# Patient Record
Sex: MALE | Race: WHITE | Employment: OTHER | URBAN - METROPOLITAN AREA
[De-identification: names, ages, dates, MRNs, and addresses within clinical notes are randomized per-mention and may not be internally consistent; named-entity substitution may affect disease eponyms.]

---

## 2021-05-18 PROBLEM — M25.561 CHRONIC PAIN OF BOTH KNEES: Status: ACTIVE | Noted: 2021-05-18

## 2021-05-18 PROBLEM — M25.562 CHRONIC PAIN OF BOTH KNEES: Status: ACTIVE | Noted: 2021-05-18

## 2021-05-18 PROBLEM — G89.29 CHRONIC PAIN OF BOTH KNEES: Status: ACTIVE | Noted: 2021-05-18

## 2022-03-19 PROBLEM — G89.29 CHRONIC PAIN OF BOTH KNEES: Status: ACTIVE | Noted: 2021-05-18

## 2022-03-19 PROBLEM — M25.561 CHRONIC PAIN OF BOTH KNEES: Status: ACTIVE | Noted: 2021-05-18

## 2022-03-19 PROBLEM — M25.562 CHRONIC PAIN OF BOTH KNEES: Status: ACTIVE | Noted: 2021-05-18

## 2022-03-22 ENCOUNTER — HOSPITAL ENCOUNTER (OUTPATIENT)
Dept: PHYSICAL THERAPY | Age: 87
Discharge: HOME | End: 2022-03-22
Payer: MEDICARE

## 2022-03-22 PROCEDURE — 97110 THERAPEUTIC EXERCISES: CPT

## 2022-03-22 PROCEDURE — 97161 PT EVAL LOW COMPLEX 20 MIN: CPT

## 2022-03-22 NOTE — THERAPY EVALUATION
Rica Iqbal  : 1930  Primary: James Mcgill Medicare Advantage  Secondary:  [address] at [facility]  [address], [address]  [phone]   [phone]          OUTPATIENT PHYSICAL THERAPY:Initial Assessment 3/22/2022   ICD-10: Treatment Diagnosis: M62.81, R26.89  Precautions/Allergies:   Patient has no known allergies. TREATMENT PLAN:  Effective Dates: 3/22/2022 TO 2022 (90 days). Frequency/Duration: 2 times a week for 90 Day(s) MEDICAL/REFERRING DIAGNOSIS:  Unspecified fall, initial encounter [W19. XXXA]  Other abnormalities of gait and mobility [R26.89]   DATE OF ONSET: Several months  REFERRING PHYSICIAN: Juan Weston MD MD Orders: Suzi Keating and treat  Return MD Appointment:      INITIAL ASSESSMENT:  Mr. Nik Patel presents to physical therapy with MD diagnosis of a frequent falls. Pt demonstrated signs and symptoms consistent with this diagnosis. On objective examination, the patient demonstrated deficits in ROM, strength, joint mobility, soft tissue mobility, functional ability, functional mobility, ambulatory ability and balance. The patient also had increased fall risk. The patient is limited in the following activities: walking, standing, transfers, ADLs, functional tasks, mobility, stairs. The patient has a good  prognosis for recovery based on the examination findings and may be limited by: N/A. Patient requires skilled physical therapy services in order to return to prior level of function. PROBLEM LIST (Impacting functional limitations):  1. Decreased Strength  2. Decreased ADL/Functional Activities  3. Decreased Transfer Abilities  4. Decreased Ambulation Ability/Technique  5. Decreased Balance  6. Increased Pain  7. Decreased Activity Tolerance  8. Decreased Knowledge of Precautions  9.  Decreased [place] with Home Exercise Program INTERVENTIONS PLANNED: (Treatment may consist of any combination of the following)  1. Balance Exercise  2. Cold  3. Heat  4. Home Exercise Program (HEP)  5. Manual Therapy  6. Neuromuscular Re-education/Strengthening  7. Range of Motion (ROM)  8. Therapeutic Activites  9. Therapeutic Exercise/Strengthening     GOALS: (Goals have been discussed and agreed upon with patient.)  Short-Term Functional Goals: Time Frame: 4 weeks  1. Pt will be compliant with progressive HEP  2. Pt will be able to perform 10min on aerobic exercise device without significant fatigue (> 5/10)  Discharge Goals: Time Frame: 10 weeks  1. Pt will be able to walk > 20min without significant fatigue (> 5/10)  2. Pt will improve ALONZO score by 6pts  3. Pt will improve DGI score by 4pts    OUTCOME MEASURE:   Tool Used: Alonzo Balance Scale  Score:  Initial: 36/56 Most Recent: X/56 (Date: -- )   Interpretation of Score: Each section is scored on a 0-4 scale, 0 representing the patients inability to perform the task and 4 representing independence. The scores of each section are added together for a total score of 56. The higher the patients score, the more independent the patient is. Any score below 45 indicates increased risk for falls. MEDICAL NECESSITY:   · Patient is expected to demonstrate progress in strength, range of motion and symptom levels to return to full function. REASON FOR SERVICES/OTHER COMMENTS:  · Patient requires skilled physical therapy in order to return to prior level of function      Rehabilitation Potential For Stated Goals: Good  Regarding Tea Moise's therapy, I certify that the treatment plan above will be carried out by a therapist or under their direction.   Thank you for this referral,  Romeo Fajardo PT, DPT, OCS  Referring Physician Signature: Valerie Woods MD _______________________________ Date _____________     PAIN/SUBJECTIVE:   Initial:   0/10 Post Session:  0/10   HISTORY:   History of Injury/Illness (Reason for Referral):  Pt had a fall in the last week, this is fall 2-3 in the last few months. All the falls have had similar causes, feels weak in a leg and stumbles. Has not sustained any significant injuries with these falls. States he is having difficulty rising from a bent over or squatted position. Feels weaker overall. Pt has a cane, carries it around more than he uses it, Denies numbness/tingling in the legs. Walking > 10-15min fatigues him. Past Medical History/Comorbidities:   Mr. Dia Potter  has no past medical history on file. Mr. Dia Potter  has a past surgical history that includes hx radical prostatectomy; hx cataract removal; and hx colonoscopy. Social History/Living Environment:     2-3 falls in the last 3 months  10-12 stairs in house  Lives with wife  Prior Level of Function/Work/Activity:  Golf, yard work, walk  Personal Factors:          Sex:  male        Age:  80 y.o. Ambulatory/Rehab Services H2 Model Falls Risk Assessment   Risk Factors:       (1)  Gender [Male]       (5)  History of Recent Falls [w/in 3 months] Ability to Rise from Chair:       (1)  Pushes up, successful in one attempt   Falls Prevention Plan: Mobility Assistance Device (specify):  has a quad cane   Total: (5 or greater = High Risk): 7   ©2010 [facility] [address] [place] [address] States Patent #[id number]. Federal Law prohibits the replication, distribution or use without written permission from [facility]   Current Medications:       Current Outpatient Medications:     OTHER,NON-FORMULARY,, Take 1 Tab by mouth daily. , Disp: , Rfl:     aspirin delayed-release 81 mg tablet, Take 81 mg by mouth daily. , Disp: , Rfl:     carvediloL (COREG) 3.125 mg tablet, , Disp: , Rfl:     Iron Fum & PS Cmp-Vit C-Niacin (Integra) 125-40-3 mg cap, Take 1 Cap by mouth daily. , Disp: , Rfl:     levothyroxine (SYNTHROID) 75 mcg tablet, , Disp: , Rfl:     Iron Fum & P-FA-Vit B & C No.9 (INTEGRA PLUS) 125 mg iron- 1 mg cap, Take  by mouth., Disp: , Rfl:    multivitamin (DAILY MULTIPLE) tablet, Take 1 Tab by mouth daily. , Disp: , Rfl:    Date Last Reviewed:  03/22/22     Number of Personal Factors/Comorbidities that affect the Plan of Care: 1-2: MODERATE COMPLEXITY   EXAMINATION:   *= Painful     WNL= within normal limits     NT= not tested    Observation  Gait: quick, erratic at times, B varus, leans to the R and L causing him to have a non-straight path      Strength (all MMT scores are graded on a scale of 0-5)   Right Left   Hip      Flexion 5 5   Abduction 4 4   Extension 4 4   Glute Max 4 4   Knee     Extension 5 5   Flexion 5 5         Functional Mobility  TUG: 10.8sec  30sec Sit to Stand: 20x (w/ hands, lacks control), 4x (w/o hands, fatigues quickly)      Balance  Rhomberg:   Eyes open, stable surface: 120sec   Eyes closed, stable surface: 10sec (very unsteady initially)  Sharpened Rhomberg: (modified)   R in front: 30sec   L in front: 30sec  SL balance:   R: 0sec   L: 0sec    Tool Used: TINETTI  Score:  Initial:   Gait: 9/12  Balance: 10/16  TOTAL: 19/28 Most Recent:  Gait: /12  Balance: /16  TOTAL: /28   Interpretation of Score: The maximum score for the gait component is 12 points. The maximum score for the balance component is 16 points. The maximum total score is 28 points. In general, patients who score below 19 are at a high risk for falls. Patients who score in the range of 19-24 indicate that the patient has a risk for falls. Tool Used: Dynamic Gait Index  Score:  Initial: 14/24 Most Recent: X/24 (Date: -- )   Interpretation of Score: Each section is scored on a 0-3 scale, 0 representing the patients inability to perform the task and 3 representing independence. The scores of each section are added together for a total score of 24. Any score below 19 indicates increased risk for falls. Body Structures Involved:  1. Bones  2. Joints  3. Muscles  4. Ligaments Body Functions Affected:  1. Sensory/Pain  2. Neuromusculoskeletal  3.  Movement Related Activities and Participation Affected:  1. General Tasks and Demands  2. Mobility  3. Self Care  4. Domestic Life  5. Interpersonal Interactions and Relationships  6.  Community, Social and [place]   Number of elements (examined above) that affect the Plan of Care: 3: MODERATE COMPLEXITY   CLINICAL PRESENTATION:   Presentation: Stable and uncomplicated: LOW COMPLEXITY   CLINICAL DECISION MAKING:   Use of outcome tool(s) and clinical judgement create a POC that gives a: Clear prediction of patient's progress: LOW COMPLEXITY     Jordan Kingston PT, DPT, OCS

## 2022-03-22 NOTE — PROGRESS NOTES
Rehana Ochoa  : 1930  Primary: Florencio Mcgill Medicare Advantage  Secondary:  [address] at [facility]  [address], [address]  [phone]   [phone]         OUTPATIENT PHYSICAL THERAPY:Daily Note 3/22/2022      TREATMENT:   PT Patient Time In/Time Out  Time In: 1300  Time Out: 1345      Total Time: 45min  Visit Count:  1     ICD-10: Treatment Diagnosis: M62.81, R26.89  Medication Last Reviewed: 22      TREATMENT PLAN  Effective Dates: 3/22/2022 TO 2022 (90 days). Frequency/Duration: 2 times a week for 90 Day(s)         Subjective: See Evaluation Note dated 3/22/2022 for details   Pain:     Objective: See Evaluation Note dated 3/22/2022 for details      Therapeutic Exercise: (15min) Done in order to improve strength, ROM and understanding of current condition.     Date:  3/22 Date:   Date:   Date:     Activity/Exercise Parameters      Education Discussed examination findings, HEP, plan of care      Standing Balance 3x feet together  · Eyes open x30sec  · 10x vertical head nods  · 10x R/L head turns                                                                         Manual Therapy: (0min) Done in order to improve joint and soft tissue mobility,reduce muscle guarding, and decrease muscle tone   Date:   Date:   Date:   Date:     Type Parameters      Joint Mobilization       Soft Tissue Mobilization           Modalities: (-) Done in order to reduce swelling and pain    Assessment: See Evaluation Note dated 3/22/2022 for details    Plan: See Evaluation Note dated 3/22/2022 for details    Future Appointments   Date Time Provider Deborah Kwon   3/29/2022  2:30 PM Frieda Bellis, PT [facility]   3/31/2022 10:15 AM Frieda Bellis, PT [facility]   2022  3:15 PM Frieda Bellis, PT [facility]   2022 10:15 AM Frieda Bellis, PT [facility]       Unbilled Time: 30min eval  Units: 1 eval low/ CHITO Stephen, PT, DPT, OCS    Visit Approval Visit # Therapist initials Date A NS / Cx < 24 hr >24 hr Cx Comments    1 WJ 3/22 [x]  [] [] Initial evaluation       [] [] []        [] [] []        [] [] []        [] [] []        [] [] []        [] [] []        [] [] []        [] [] []        [] [] []        [] [] []        [] [] []        [] [] []        [] [] []        [] [] []        [] [] []        [] [] []        [] [] []

## 2022-03-29 ENCOUNTER — HOSPITAL ENCOUNTER (OUTPATIENT)
Dept: PHYSICAL THERAPY | Age: 87
Discharge: HOME | End: 2022-03-29
Payer: MEDICARE

## 2022-03-29 PROCEDURE — 97110 THERAPEUTIC EXERCISES: CPT

## 2022-03-29 NOTE — PROGRESS NOTES
Gera Cons  : 1930  Primary: Ervin Mcgill Medicare Advantage  Secondary:  [address] at [facility]  [address], [facility], [address]  [phone]   [phone]         OUTPATIENT PHYSICAL THERAPY:Daily Note 3/29/2022      TREATMENT:   PT Patient Time In/Time Out  Time In: 1430  Time Out: 1515      Total Time: 45min  Visit Count:  2     ICD-10: Treatment Diagnosis: M62.81, R26.89  Medication Last Reviewed: 22      TREATMENT PLAN  Effective Dates: 3/22/2022 TO 2022 (90 days). Frequency/Duration: 2 times a week for 90 Day(s)         Subjective: Pt states he is tired, exercises have been going well   Pain:     Objective: Therapeutic Exercise: (45min) Done in order to improve strength, ROM and understanding of current condition. Date:  3/22 Date:  3/29 Date:   Date:     Activity/Exercise Parameters      Education Discussed examination findings, HEP, plan of care      NuStep  x10min lvl 4     Standing Balance 3x feet together  · Eyes open x30sec  · 10x vertical head nods  · 10x R/L head turns · Feet together, EO, R/L and vertical head turns  · Semi-tandem EO  · Ball passes     Hip ABD  2x10 B 25lbs     Hip Flexion  2x10 B 25lbs     Sit to Stand  3x10 (2x10 w/ airex in chair)     Walking  x500ft cone weaves and outside                                            Manual Therapy: (0min) Done in order to improve joint and soft tissue mobility,reduce muscle guarding, and decrease muscle tone   Date:   Date:   Date:   Date:     Type Parameters      Joint Mobilization       Soft Tissue Mobilization           Modalities: (-) Done in order to reduce swelling and pain    Assessment: Pt tolerated session well, L LE will randomly give out.  Needs to be cued to slow down    Plan: continue per POC    Future Appointments   Date Time Provider Deborah Kwon   3/31/2022 10:15 AM Darrian León PT [facility]   2022  3:15 PM Rodriguez Reza Manolo Vo, PT [facility]   4/7/2022 10:15 AM Diana Renteria, PT [facility]       Sudheer Romeo Time:  Units: Cleda Duel, PT, DPT, OCS    Visit Approval Visit # Therapist initials Date A NS / Cx < 24 hr >24 hr Cx Comments    1 WJ 3/22 [x]  [] [] Initial evaluation    2 WJ 3/29 [x] [] []        [] [] []        [] [] []        [] [] []        [] [] []        [] [] []        [] [] []        [] [] []        [] [] []        [] [] []        [] [] []        [] [] []        [] [] []        [] [] []        [] [] []        [] [] []        [] [] []

## 2022-03-31 ENCOUNTER — HOSPITAL ENCOUNTER (OUTPATIENT)
Dept: PHYSICAL THERAPY | Age: 87
Discharge: HOME | End: 2022-03-31
Payer: MEDICARE

## 2022-03-31 PROCEDURE — 97110 THERAPEUTIC EXERCISES: CPT

## 2022-03-31 NOTE — PROGRESS NOTES
Nicole Benson  : 1930  Primary: Al Mcgill Medicare Advantage  Secondary:  [address] at [facility]  [address], [address]  [phone]   [phone]         OUTPATIENT PHYSICAL THERAPY:Daily Note 3/31/2022      TREATMENT:   PT Patient Time In/Time Out  Time In: 1015  Time Out: 1100      Total Time: 45min  Visit Count:  3     ICD-10: Treatment Diagnosis: M62.81, R26.89  Medication Last Reviewed: 22      TREATMENT PLAN  Effective Dates: 3/22/2022 TO 2022 (90 days). Frequency/Duration: 2 times a week for 90 Day(s)         Subjective: Pt states he was sore after his last visit   Pain:     Objective: Therapeutic Exercise: (45min) Done in order to improve strength, ROM and understanding of current condition. Date:  3/22 Date:  3/29 Date:  3/31 Date:     Activity/Exercise Parameters      Education Discussed examination findings, HEP, plan of care      NuStep  x10min lvl 4 x10min lvl 4    Standing Balance 3x feet together  · Eyes open x30sec  · 10x vertical head nods  · 10x R/L head turns · Feet together, EO, R/L and vertical head turns  · Semi-tandem EO  · Ball passes · Feet together, EO, R/L and vertical head turns  · Semi-tandem EO  · Ball passes  · DL on airex  · DL EC    Hip ABD  2x10 B 25lbs     Hip Flexion  2x10 B 25lbs     Sit to Stand  3x10 (2x10 w/ airex in chair)     Walking  x500ft cone weaves and outside                                            Manual Therapy: (0min) Done in order to improve joint and soft tissue mobility,reduce muscle guarding, and decrease muscle tone   Date:   Date:   Date:   Date:     Type Parameters      Joint Mobilization       Soft Tissue Mobilization           Modalities: (-) Done in order to reduce swelling and pain    Assessment: Pt performed well with balance tasks, continues to have instances where the L LE randomly gives out.  Able to increase difficulty of some balance tasks    Plan: continue per POC    Future Appointments   Date Time Provider Deborah Cher   4/4/2022  3:15 PM Jyoti Roberson, PT [facility]   4/7/2022 10:15 AM Jyoti Roberson, PT [facility]       Stephan Romo Time:  Units: Gabriela Smiley, PT, DPT, OCS    Visit Approval Visit # Therapist initials Date A NS / Cx < 24 hr >24 hr Cx Comments    1 WJ 3/22 [x]  [] [] Initial evaluation    2 WJ 3/29 [x] [] []     3 WJ 3/31 [x] [] []        [] [] []        [] [] []        [] [] []        [] [] []        [] [] []        [] [] []        [] [] []        [] [] []        [] [] []        [] [] []        [] [] []        [] [] []        [] [] []        [] [] []        [] [] []

## 2022-04-04 ENCOUNTER — HOSPITAL ENCOUNTER (OUTPATIENT)
Dept: PHYSICAL THERAPY | Age: 87
Discharge: HOME | End: 2022-04-04
Payer: MEDICARE

## 2022-04-04 PROCEDURE — 97110 THERAPEUTIC EXERCISES: CPT

## 2022-04-04 NOTE — PROGRESS NOTES
Apple Whitman  : 1930  Primary: Bonifacio Mcgill Medicare Advantage  Secondary:  [address] at [facility]  [address], [facility], [address]  [phone]   [phone]         OUTPATIENT PHYSICAL THERAPY:Daily Note 2022      TREATMENT:   PT Patient Time In/Time Out  Time In: 1515  Time Out: 1600      Total Time: 45min  Visit Count:  4     ICD-10: Treatment Diagnosis: M62.81, R26.89  Medication Last Reviewed: 22      TREATMENT PLAN  Effective Dates: 3/22/2022 TO 2022 (90 days). Frequency/Duration: 2 times a week for 90 Day(s)         Subjective: Pt states he was feeling good after last visit, not as sore   Pain:     Objective: Therapeutic Exercise: (45min) Done in order to improve strength, ROM and understanding of current condition.     Date:  3/22 Date:  3/29 Date:  3/31 Date:     Activity/Exercise Parameters      Education Discussed examination findings, HEP, plan of care      NuStep  x10min lvl 4 x10min lvl 4 x10min lvl 4   Standing Balance 3x feet together  · Eyes open x30sec  · 10x vertical head nods  · 10x R/L head turns · Feet together, EO, R/L and vertical head turns  · Semi-tandem EO  · Ball passes · Feet together, EO, R/L and vertical head turns  · Semi-tandem EO  · Ball passes  · DL on airex  · DL EC    Hip ABD  2x10 B 25lbs  3x10 B 17.5lbs   Hip Flexion  2x10 B 25lbs  3x10 B 17.5lbs   Sit to Stand  3x10 (2x10 w/ airex in chair)  3x10 (airex in chair)   Walking  x500ft cone weaves and outside     Sidestepping    3x1min   Step Taps    3x1min (one hand)                            Manual Therapy: (0min) Done in order to improve joint and soft tissue mobility,reduce muscle guarding, and decrease muscle tone   Date:   Date:   Date:   Date:     Type Parameters      Joint Mobilization       Soft Tissue Mobilization           Modalities: (-) Done in order to reduce swelling and pain    Assessment: Pt tolerated strengthening well, minimal pain or soreness during or after session.     Plan: continue per POC    Future Appointments   Date Time Provider Deborah Kwon   4/7/2022 10:15 AM ROSMERY Monteiro Time:  Units: Baldemar Espinoza, PT, DPT, OCS    Visit Approval Visit # Therapist initials Date A NS / Cx < 24 hr >24 hr Cx Comments    1 WJ 3/22 [x]  [] [] Initial evaluation    2 WJ 3/29 [x] [] []     3 WJ 3/31 [x] [] []     4 WJ 4/4 [x] [] []        [] [] []        [] [] []        [] [] []        [] [] []        [] [] []        [] [] []        [] [] []        [] [] []        [] [] []        [] [] []        [] [] []        [] [] []        [] [] []        [] [] []

## 2022-04-07 ENCOUNTER — HOSPITAL ENCOUNTER (OUTPATIENT)
Dept: PHYSICAL THERAPY | Age: 87
Discharge: HOME | End: 2022-04-07
Payer: MEDICARE

## 2022-04-07 PROCEDURE — 97110 THERAPEUTIC EXERCISES: CPT

## 2022-04-07 NOTE — PROGRESS NOTES
Jerman Lagos  : 1930  Primary: Gabrielle Mcgill Medicare Advantage  Secondary:  [address] at [facility]  [address], [address]  [phone]   [phone]         OUTPATIENT PHYSICAL THERAPY:Daily Note 2022      TREATMENT:   PT Patient Time In/Time Out  Time In: 1015  Time Out: 1100      Total Time: 45min  Visit Count:  5     ICD-10: Treatment Diagnosis: M62.81, R26.89  Medication Last Reviewed: 22      TREATMENT PLAN  Effective Dates: 3/22/2022 TO 2022 (90 days). Frequency/Duration: 2 times a week for 90 Day(s)         Subjective: Pt states he was a little sore, but doing ok   Pain: 1/10    Objective: Therapeutic Exercise: (45min) Done in order to improve strength, ROM and understanding of current condition.     Date:  3/29 Date:  3/31 Date:   Date:     Activity/Exercise       Education       NuStep x10min lvl 4 x10min lvl 4 x10min lvl 4 x10min lvl 4   Standing Balance · Feet together, EO, R/L and vertical head turns  · Semi-tandem EO  · Ball passes · Feet together, EO, R/L and vertical head turns  · Semi-tandem EO  · Ball passes  · DL on airex  · DL EC  · Feet together, EO, R/L and vertical head turns  · Semi-tandem EO  · DL on airex  · Perturbations in DL EO  · DL EX   Hip ABD 2x10 B 25lbs  3x10 B 17.5lbs    Hip Flexion 2x10 B 25lbs  3x10 B 17.5lbs    Sit to Stand 3x10 (2x10 w/ airex in chair)  3x10 (airex in chair)    Walking x500ft cone weaves and outside      Sidestepping   3x1min 3x1min   Step Taps   3x1min (one hand)                             Manual Therapy: (0min) Done in order to improve joint and soft tissue mobility,reduce muscle guarding, and decrease muscle tone   Date:   Date:   Date:   Date:     Type Parameters      Joint Mobilization       Soft Tissue Mobilization           Modalities: (-) Done in order to reduce swelling and pain    Assessment: Pt doing well overall, improvements in balance    Plan: continue per POC    Future Appointments   Date Time Provider Deborah Cher   4/13/2022  8:00 AM Oscar Booker, PT [facility]   4/25/2022 11:00 AM Oscar Booker, PT [facility]   4/28/2022 10:15 AM Oscar Booker, PT [facility]       Odeatravis Maza Time:  Units: Abram Harps, PT, DPT, OCS    Visit Approval Visit # Therapist initials Date A NS / Cx < 24 hr >24 hr Cx Comments    1 WJ 3/22 [x]  [] [] Initial evaluation    2 WJ 3/29 [x] [] []     3 WJ 3/31 [x] [] []     4 WJ 4/4 [x] [] []     5 WJ 4/7 [x] [] []        [] [] []        [] [] []        [] [] []        [] [] []        [] [] []        [] [] []        [] [] []        [] [] []        [] [] []        [] [] []        [] [] []        [] [] []        [] [] []

## 2022-04-13 ENCOUNTER — APPOINTMENT (OUTPATIENT)
Dept: PHYSICAL THERAPY | Age: 87
End: 2022-04-13
Payer: MEDICARE

## 2022-04-25 ENCOUNTER — APPOINTMENT (OUTPATIENT)
Dept: PHYSICAL THERAPY | Age: 87
End: 2022-04-25
Payer: MEDICARE

## 2022-04-28 ENCOUNTER — HOSPITAL ENCOUNTER (OUTPATIENT)
Dept: PHYSICAL THERAPY | Age: 87
Discharge: HOME | End: 2022-04-28
Payer: MEDICARE

## 2022-04-28 PROCEDURE — 97110 THERAPEUTIC EXERCISES: CPT

## 2022-04-28 NOTE — PROGRESS NOTES
Ulyess Cons  : 1930  Primary: Ousmane Mcgill Medicare Advantage  Secondary:  [address]  at [place] [address] Therapy  [address], [address]  [phone]   [phone]         OUTPATIENT PHYSICAL THERAPY:Daily Note 2022      TREATMENT:   PT Patient Time In/Time Out  Time In: 1015  Time Out: 1100      Total Time: 45min  Visit Count:  6     ICD-10: Treatment Diagnosis: M62.81, R26.89  Medication Last Reviewed: 22      TREATMENT PLAN  Effective Dates: 3/22/2022 TO 2022 (90 days). Frequency/Duration: 2 times a week for 90 Day(s)         Subjective: Patient reports feeling pretty good just his restless legs sometimes.  Pain: 1/10    Objective: Therapeutic Exercise: (45min) Done in order to improve strength, ROM and understanding of current condition. Date:  3/31 Date:   Date:   Date     Activity/Exercise       Education       NuStep x10min lvl 4 x10min lvl 4 x10min lvl 4 x10min lvl 4   Standing Balance · Feet together, EO, R/L and vertical head turns  · Semi-tandem EO  · Ball passes  · DL on airex  · DL EC  · Feet together, EO, R/L and vertical head turns  · Semi-tandem EO  · DL on airex  · Perturbations in DL EO  · DL EX · Feet together, EO, R/L and vertical head turns  · Semi-tandem EO  · DL on airex  · Perturbations in DL EO  · DL EX   Hip ABD  3x10 B 17.5lbs  3x10 B 17.5lbs   Hip Flexion  3x10 B 17.5lbs  3x10 B 17.5lbs   Sit to Stand  3x10 (airex in chair)  3x10 (airex in chair)   Walking       Sidestepping  3x1min 3x1min    Step Taps  3x1min (one hand)                              Manual Therapy: (0min) Done in order to improve joint and soft tissue mobility,reduce muscle guarding, and decrease muscle tone   Date:   Date:   Date:   Date:     Type Parameters      Joint Mobilization       Soft Tissue Mobilization           Modalities: (-) Done in order to reduce swelling and pain    Assessment: Patient tolerated treatment well. Needs to continue to work on overall LE strength.     Plan: continue per POC    Future Appointments   Date Time Provider Deborah Kwon   5/4/2022  1:00 PM Jules Marie [facility]   5/5/2022  3:15 PM Gabi Martinez PT [facility]       Savita Grimes Time:  Units: Margie Place, PTA    Visit Approval Visit # Therapist initials Date A NS / Cx < 24 hr >24 hr Cx Comments    1 WJ 3/22 [x]  [] [] Initial evaluation    2 WJ 3/29 [x] [] []     3 WJ 3/31 [x] [] []     4 WJ 4/4 [x] [] []     5 WJ 4/7 [x] [] []     6 [facility] [address] 4/28 [x] [] []        [] [] []        [] [] []        [] [] []        [] [] []        [] [] []        [] [] []        [] [] []        [] [] []        [] [] []        [] [] []        [] [] []        [] [] []

## 2022-05-02 ENCOUNTER — HOSPITAL ENCOUNTER (OUTPATIENT)
Dept: PHYSICAL THERAPY | Age: 87
Setting detail: RECURRING SERIES
Discharge: HOME | End: 2022-05-05

## 2022-05-04 ENCOUNTER — HOSPITAL ENCOUNTER (OUTPATIENT)
Dept: PHYSICAL THERAPY | Age: 87
Discharge: HOME | End: 2022-05-04
Payer: MEDICARE

## 2022-05-04 PROCEDURE — 97110 THERAPEUTIC EXERCISES: CPT

## 2022-05-04 NOTE — PROGRESS NOTES
Clare Denise  : 1930  Primary: Alcira Mcgill Medicare Advantage  Secondary:  [address]  at [facility] Therapy  [address], [address]  [phone]   [phone]         OUTPATIENT PHYSICAL THERAPY:Daily Note 2022      TREATMENT:   PT Patient Time In/Time Out  Time In: 0100  Time Out: 0145      Total Time: 45min  Visit Count:  7     ICD-10: Treatment Diagnosis: M62.81, R26.89  Medication Last Reviewed: 22      TREATMENT PLAN  Effective Dates: 3/22/2022 TO 2022 (90 days). Frequency/Duration: 2 times a week for 90 Day(s)         Subjective: Patient reports legs feel about the same.  Pain: 1/10    Objective: Therapeutic Exercise: (45min) Done in order to improve strength, ROM and understanding of current condition.     Date:   Date:   Date   Date     Activity/Exercise       Education       NuStep x10min lvl 4 x10min lvl 4 x10min lvl 4 x10min lvl 4   Standing Balance  · Feet together, EO, R/L and vertical head turns  · Semi-tandem EO  · DL on airex  · Perturbations in DL EO  · DL EX · Feet together, EO, R/L and vertical head turns  · Semi-tandem EO  · DL on airex  · Perturbations in DL EO  · DL EX · Feet together, EO, R/L and vertical head turns  · Semi-tandem EO  · DL on airex  · Perturbations in DL EO  · DL EX   Hip ABD 3x10 B 17.5lbs  3x10 B 17.5lbs 3x10 B 17.5lbs   Hip Flexion 3x10 B 17.5lbs  3x10 B 17.5lbs 3x10 B 17.5lbs   Sit to Stand 3x10 (airex in chair)  3x10 (airex in chair) 3x10 (airex in chair)   Walking       Sidestepping 3x1min 3x1min     Step Taps 3x1min (one hand)                               Manual Therapy: (0min) Done in order to improve joint and soft tissue mobility,reduce muscle guarding, and decrease muscle tone   Date:   Date:   Date:   Date:     Type Parameters      Joint Mobilization       Soft Tissue Mobilization           Modalities: (-) Done in order to reduce swelling and pain    Assessment: Patient tolerated treatment well. Good effort with all exercises and better ambulation.     Plan: continue per POC    Future Appointments   Date Time Provider Deborah Kwon   5/5/2022  3:15 PM ROSMERY Owens [facility]       Lamberto Cantu Time:  Units: Mars He, JUANITA    Visit Approval Visit # Therapist initials Date A NS / Cx < 24 hr >24 hr Cx Comments    1 WJ 3/22 [x]  [] [] Initial evaluation    2 W 3/29 [x] [] []     3 W 3/31 [x] [] []     4 W 4/4 [x] [] []     5 W 4/7 [x] [] []     6 [address] 4/28 [x] [] []     7 [address] 5/4 [x] [] []        [] [] []        [] [] []        [] [] []        [] [] []        [] [] []        [] [] []        [] [] []        [] [] []        [] [] []        [] [] []        [] [] []

## 2022-05-05 ENCOUNTER — HOSPITAL ENCOUNTER (OUTPATIENT)
Dept: PHYSICAL THERAPY | Age: 87
Discharge: HOME | End: 2022-05-05
Payer: MEDICARE

## 2022-05-05 PROCEDURE — 97110 THERAPEUTIC EXERCISES: CPT

## 2022-05-05 NOTE — PROGRESS NOTES
Demarco Mathiswilly  : 1930  Primary: Krysta Moreno Aeleighkylie Medicare Advantage  Secondary:  [address] [place] [place]  at [address]  [address], [address]  [phone]   [phone]         OUTPATIENT PHYSICAL THERAPY:Daily Note 2022      TREATMENT:   PT Patient Time In/Time Out  Time In: 1515  Time Out: 1600      Total Time: 45min  Visit Count:  8     ICD-10: Treatment Diagnosis: M62.81, R26.89  Medication Last Reviewed: 22      TREATMENT PLAN  Effective Dates: 3/22/2022 TO 2022 (90 days). Frequency/Duration: 2 times a week for 90 Day(s)         Subjective: Patient reports he feels fine, little sore from yesterday   Pain: 1/10    Objective: Therapeutic Exercise: (45min) Done in order to improve strength, ROM and understanding of current condition.     Date:   Date   Date   Date:     Activity/Exercise       Education       NuStep x10min lvl 4 x10min lvl 4 x10min lvl 4 x10min lvl 4   Standing Balance · Feet together, EO, R/L and vertical head turns  · Semi-tandem EO  · DL on airex  · Perturbations in DL EO  · DL EX · Feet together, EO, R/L and vertical head turns  · Semi-tandem EO  · DL on airex  · Perturbations in DL EO  · DL EX · Feet together, EO, R/L and vertical head turns  · Semi-tandem EO  · DL on airex  · Perturbations in DL EO  · DL EX · Feet together, EO, R/L and vertical head turns  · Semi-tandem EO  · DL on airex  · Perturbations in DL EO   Hip ABD  3x10 B 17.5lbs 3x10 B 17.5lbs 2x10 B 17.5lbs   Hip Flexion  3x10 B 17.5lbs 3x10 B 17.5lbs 2xx10 B 17.5lbs   Sit to Stand  3x10 (airex in chair) 3x10 (airex in chair) 3x10 (airex in chair)   Walking       Sidestepping 3x1min      Step Taps                                Manual Therapy: (0min) Done in order to improve joint and soft tissue mobility,reduce muscle guarding, and decrease muscle tone   Date:   Date:   Date:   Date:     Type Parameters      Joint Mobilization       Soft Tissue Mobilization           Modalities: (-) Done in order to reduce swelling and pain    Assessment: Patient tolerated treatment well.  Improved balance    Plan: continue per POC    Future Appointments   Date Time Provider Deborah Kwon   5/17/2022  1:45 PM ROSMERY Recio   5/19/2022 11:[facility] Time:  Units: Queenie Bumps, PT, DPT, OCS    Visit Approval Visit # Therapist initials Date A NS / Cx < 24 hr >24 hr Cx Comments    1 WJ 3/22 [x]  [] [] Initial evaluation    2 WJ 3/29 [x] [] []     3 WJ 3/31 [x] [] []     4 WJ 4/4 [x] [] []     5 WJ 4/7 [x] [] []     6 [address] [facility] 4/28 [x] [] []     7 [facility] 5/4 [x] [] []     8 WJ 5/5 [x] [] []        [] [] []        [] [] []        [] [] []        [] [] []        [] [] []        [] [] []        [] [] []        [] [] []        [] [] []        [] [] []

## 2022-05-17 ENCOUNTER — HOSPITAL ENCOUNTER (OUTPATIENT)
Dept: PHYSICAL THERAPY | Age: 87
Discharge: HOME | End: 2022-05-17
Payer: MEDICARE

## 2022-05-17 NOTE — PROGRESS NOTES
Bard Omalley  : 1930  Primary: Karligen Benton [employer] Medicare Advantage  Secondary:  [address] at [facility] Therapy  [address], [facility] [address]  [phone]   [phone]        OUTPATIENT DAILY NOTE    NAME/AGE/GENDER: Bard Omalley is a 80 y.o. male.      DATE: 2022    Mr. Driss Brink CANCELED for today's appointment due to had to take his wife to the dentist.    Virgil Nunes, PT

## 2022-05-19 ENCOUNTER — HOSPITAL ENCOUNTER (OUTPATIENT)
Dept: PHYSICAL THERAPY | Age: 87
Discharge: HOME | End: 2022-05-19
Payer: MEDICARE

## 2022-05-19 PROCEDURE — 97110 THERAPEUTIC EXERCISES: CPT

## 2022-05-19 NOTE — PROGRESS NOTES
Apple Whitman  : 1930  Primary: Bonifacio Mcgill Medicare Advantage  Secondary:  [address] at [facility] Therapy  [address], [facility], [address]  [phone]   [phone]         OUTPATIENT PHYSICAL THERAPY:Daily Note 2022      TREATMENT:   PT Patient Time In/Time Out  Time In: 1100  Time Out: 1145      Total Time: 45min  Visit Count:  9     ICD-10: Treatment Diagnosis: M62.81, R26.89  Medication Last Reviewed: 22      TREATMENT PLAN  Effective Dates: 3/22/2022 TO 2022 (90 days). Frequency/Duration: 2 times a week for 90 Day(s)         Subjective: Patient reports feeling pretty good. He states he has been taking care of his wife.  Pain: 1/10    Objective: Therapeutic Exercise: (45min) Done in order to improve strength, ROM and understanding of current condition.     Date   Date   Date:   Date     Activity/Exercise       Education       NuStep x10min lvl 4 x10min lvl 4 x10min lvl 4 x10min lvl 4   Standing Balance · Feet together, EO, R/L and vertical head turns  · Semi-tandem EO  · DL on airex  · Perturbations in DL EO  · DL EX · Feet together, EO, R/L and vertical head turns  · Semi-tandem EO  · DL on airex  · Perturbations in DL EO  · DL EX · Feet together, EO, R/L and vertical head turns  · Semi-tandem EO  · DL on airex  · Perturbations in DL EO ·    Hip ABD 3x10 B 17.5lbs 3x10 B 17.5lbs 2x10 B 17.5lbs 2x10 B 17.5lbs   Hip Flexion 3x10 B 17.5lbs 3x10 B 17.5lbs 2xx10 B 17.5lbs 2x10 B 17.5lbs   Sit to Stand 3x10 (airex in chair) 3x10 (airex in chair) 3x10 (airex in chair) 3x10 (airex in chair)   Walking       Sidestepping       Step Taps    x25   Hamstring curl machine    #25 3 x 10   Marching     X 25              Manual Therapy: (0min) Done in order to improve joint and soft tissue mobility,reduce muscle guarding, and decrease muscle tone   Date:   Date:   Date:   Date:     Type Parameters      Joint Mobilization       Soft Tissue Mobilization           Modalities: (-) Done in order to reduce swelling and pain    Assessment: Patient tolerated treatment well. Patient demonstrated good effort with all exercises. Good improvement in strength. Plan: continue per POC    No future appointments.     Unbilled Time:  Units: Shelli Bowers PTA    Visit Approval Visit # Therapist initials Date A NS / Cx < 24 hr >24 hr Cx Comments    1 WJ 3/22 [x]  [] [] Initial evaluation    2 W 3/29 [x] [] []     3 W 3/31 [x] [] []     4 W 4/4 [x] [] []     5 W 4/7 [x] [] []     6 [address] 4/28 [x] [] []     7 [address] 5/4 [x] [] []     8 W 5/5 [x] [] []     9 [address] 5/19 [x] [] []        [] [] []        [] [] []        [] [] []        [] [] []        [] [] []        [] [] []        [] [] []        [] [] []        [] [] []

## 2022-05-23 ENCOUNTER — HOSPITAL ENCOUNTER (OUTPATIENT)
Dept: PHYSICAL THERAPY | Age: 87
Setting detail: RECURRING SERIES
Discharge: HOME | End: 2022-05-26
Payer: MEDICARE

## 2022-05-23 PROCEDURE — 97110 THERAPEUTIC EXERCISES: CPT

## 2022-05-23 NOTE — PROGRESS NOTES
Madison Blanc  : 1930  Primary: Constantin Wing [employer] Medicare Advantage  Secondary:  [address] [place]  at [facility] Therapy  [address], [address]  [phone]   [phone]           OUTPATIENT PHYSICAL THERAPY:Daily Note 2022           TREATMENT:    PT Patient Time In/Time Out  Time In: 1100  Time Out: 1145      Total Time: 45min  Visit Count:  10     ICD-10: Treatment Diagnosis: M62.81, R26.89  Medication Last Reviewed: 22        TREATMENT PLAN  Effective Dates: 3/22/2022 TO 2022 (90 days). Frequency/Duration: 2 times a week for 90 Day(s)            Subjective: Patient reports back has been hurting some today.    · Pain: 10     Objective:         Therapeutic Exercise: (45min) Done in order to improve strength, ROM and understanding of current condition.     Date   Date   Date:   Date   Date     Activity/Exercise            Education            NuStep x10min lvl 4 x10min lvl 4 x10min lvl 4 x10min lvl 4 x10min lvl 4   Standing Balance · Feet together, EO, R/L and vertical head turns  · Semi-tandem EO  · DL on airex  · Perturbations in DL EO  · DL EX · Feet together, EO, R/L and vertical head turns  · Semi-tandem EO  · DL on airex  · Perturbations in DL EO  · DL EX · Feet together, EO, R/L and vertical head turns  · Semi-tandem EO  · DL on airex  · Perturbations in DL EO ·   ·    Hip ABD 3x10 B 17.5lbs 3x10 B 17.5lbs 2x10 B 17.5lbs 2x10 B 17.5lbs 2x10 B 17.5lbs   Hip Flexion 3x10 B 17.5lbs 3x10 B 17.5lbs 2xx10 B 17.5lbs 2x10 B 17.5lbs 2x10 B 17.5lbs   Sit to Stand 3x10 (airex in chair) 3x10 (airex in chair) 3x10 (airex in chair) 3x10 (airex in chair) 2 x 10 airex in chair   Walking            Sidestepping            Step Taps       x25 X 25   Hamstring curl machine       #25 3 x 10 #25 3 x 10   Marching        X 25     shuttle         #75 3 x 10   Standing trunk ext     2 x 10         Manual Therapy: (0min) Done in order to improve joint and soft tissue mobility,reduce muscle guarding, and decrease muscle tone    Date:    Date:    Date:    Date:      Type Parameters         Joint Mobilization           Soft Tissue Mobilization                 Modalities: (-) Done in order to reduce swelling and pain     Assessment: Patient tolerated treatment well. Patient demonstrated good effort with all exercises. Instructed patient to continue home exercises daily.      Plan: continue per POC     No future appointments.     Unbilled Time:  Units: 3TE        Gudelia Urbina, PTA     Visit Approval Visit # Therapist initials Date A NS / Cx < 24 hr >24 hr Cx Comments     1 W 3/22 [x]?   []?  []?  Initial evaluation     2  3/29 [x]?  []?  []?        3  3/31 [x]?  []?  []?        4  4/4 [x]?  []?  []?        5  4/7 [x]?  []?  []?        6 [facility] 4/28 [x]?  []?  []?        7  5/4 [x]?  []?  []?        8  5/5 [x]?  []?  []?        9 [facility] 5/19 [x]?  []?  []?         10    5/23  [x]?  []?  []?              []?  []?  []?              []?  []?  []?              []?  []?  []?              []?  []?  []?              []?  []?  []?              []?  []?  []?              []?  []?  []?              []?  []?  []?

## 2022-05-26 ENCOUNTER — HOSPITAL ENCOUNTER (OUTPATIENT)
Dept: PHYSICAL THERAPY | Age: 87
Setting detail: RECURRING SERIES
End: 2022-05-26
Payer: MEDICARE

## 2022-05-26 NOTE — PROGRESS NOTES
Sebastien Romaneva  : 1930  Primary: Jeffy Ortega Medicare Advantage [employer]  Secondary:  Matt Gonzalez @ [facility]  [address] [place] [address]  Phone: [phone]  Fax: [phone] No data recorded  No data recorded    PT Visit Info:  No data recorded       OUTPATIENT PHYSICAL THERAPY 2022     Appt Desk   Episode   MyChart      Mr. Antonio Duarte cancelled today's appointment due to schedule conflcit.      Daisy Honeycutt PTA    Future Appointments   Date Time Provider Tasneem Lyi   2022  7:00 PM Daisy Honeycutt [facility]   2022  3:15 PM Daisy Honeycutt PTA [facility]

## 2022-05-31 ENCOUNTER — HOSPITAL ENCOUNTER (OUTPATIENT)
Dept: PHYSICAL THERAPY | Age: 87
Setting detail: RECURRING SERIES
End: 2022-05-31
Payer: MEDICARE

## 2022-06-07 ENCOUNTER — HOSPITAL ENCOUNTER (OUTPATIENT)
Dept: PHYSICAL THERAPY | Age: 87
Setting detail: RECURRING SERIES
Discharge: HOME | End: 2022-06-10
Payer: MEDICARE

## 2022-06-07 PROCEDURE — 97110 THERAPEUTIC EXERCISES: CPT

## 2022-06-07 NOTE — PROGRESS NOTES
Kody Ball  : 1930  Primary: Mita Francisco [employer] Medicare Advantage  Secondary:  [address] at [facility] Therapy  [address] [facility], [facility], [address]  [phone]   [phone]           OUTPATIENT PHYSICAL THERAPY:Daily Note 2022           TREATMENT:    PT Patient Time In/Time Out  Time In: 1100  Time Out: 1145      Total Time: 45min  Visit Count:  11     ICD-10: Treatment Diagnosis: M62.81, R26.89  Medication Last Reviewed: 22        TREATMENT PLAN  Effective Dates: 3/22/2022 TO 2022 (90 days). Frequency/Duration: 2 times a week for 90 Day(s)            Subjective: Patient reports he fell last week and his leg feels a little sore.   · Pain: 2/10     Objective:         Therapeutic Exercise: (45min) Done in order to improve strength, ROM and understanding of current condition.     Date:   Date   Date   Date  22   Activity/Exercise         Education         NuStep x10min lvl 4 x10min lvl 4 x10min lvl 4 X 10 min    Standing Balance · Feet together, EO, R/L and vertical head turns  · Semi-tandem EO  · DL on airex  · Perturbations in DL EO ·   ·  ·    Hip ABD 2x10 B 17.5lbs 2x10 B 17.5lbs 2x10 B 17.5lbs 2x10 B 17.5lbs   Hip Flexion 2xx10 B 17.5lbs 2x10 B 17.5lbs 2x10 B 17.5lbs 2x10 B 17.5lbs   Sit to Stand 3x10 (airex in chair) 3x10 (airex in chair) 2 x 10 airex in chair 2 x 10 airex in chair   Walking         Sidestepping      X 5 along plinth   Step Taps   x25 X 25 X 25   Hamstring curl machine   #25 3 x 10 #25 3 x 10 #25 3 x 10   Marching    X 25      shuttle     #75 3 x 10    Standing trunk ext   2 x 10 2 x 10         Manual Therapy: (0min) Done in order to improve joint and soft tissue mobility,reduce muscle guarding, and decrease muscle tone    Date:    Date:    Date:    Date:      Type Parameters         Joint Mobilization           Soft Tissue Mobilization                 Modalities: (-) Done in order to reduce swelling and

## 2022-06-10 ENCOUNTER — HOSPITAL ENCOUNTER (OUTPATIENT)
Dept: PHYSICAL THERAPY | Age: 87
Setting detail: RECURRING SERIES
Discharge: HOME | End: 2022-06-13
Payer: MEDICARE

## 2022-06-10 PROCEDURE — 97110 THERAPEUTIC EXERCISES: CPT

## 2022-06-10 NOTE — PROGRESS NOTES
Therese Bee  : 1930  Primary: Che Price Medicare Advantage  Secondary:  [address] at [facility] Therapy  [address] [facility], [facility], [address]  [phone]   [phone]           OUTPATIENT PHYSICAL THERAPY:Daily Note 2022           TREATMENT:    PT Patient Time In/Time Out  Time In: 1100  Time Out: 1145      Total Time: 45min  Visit Count:  11     ICD-10: Treatment Diagnosis: M62.81, R26.89  Medication Last Reviewed: 22        TREATMENT PLAN  Effective Dates: 3/22/2022 TO 2022 (90 days). Frequency/Duration: 2 times a week for 90 Day(s)            Subjective: Patient reports he fell last week and his leg feels a little sore.   · Pain: 2/10     Objective:         Therapeutic Exercise: (45min) Done in order to improve strength, ROM and understanding of current condition.     Date:   Date   Date   Date  22 Date  6/10/22   Activity/Exercise          Education          NuStep x10min lvl 4 x10min lvl 4 x10min lvl 4 X 10 min     Standing Balance · Feet together, EO, R/L and vertical head turns  · Semi-tandem EO  · DL on airex  · Perturbations in DL EO ·   ·  ·  ·    Hip ABD 2x10 B 17.5lbs 2x10 B 17.5lbs 2x10 B 17.5lbs 2x10 B 17.5lbs 3 x 10 B  17.5lbs   Hip Flexion 2xx10 B 17.5lbs 2x10 B 17.5lbs 2x10 B 17.5lbs 2x10 B 17.5lbs 3 x 10 B   17.5 lbs   Sit to Stand 3x10 (airex in chair) 3x10 (airex in chair) 2 x 10 airex in chair 2 x 10 airex in chair 3 x 10 airex in chair   Walking          Sidestepping      X 5 along plinth    Step Taps   x25 X 25 X 25    Hamstring curl machine   #25 3 x 10 #25 3 x 10 #25 3 x 10 #25 3 x 10   Marching    X 25   3 x 20 with 1 min rest      shuttle     #75 3 x 10     Standing trunk ext   2 x 10 2 x 10          Manual Therapy: (0min) Done in order to improve joint and soft tissue mobility,reduce muscle guarding, and decrease muscle tone    Date:    Date:    Date:    Date:      Type Parameters         Joint Mobilization           Soft Tissue Mobilization                 Modalities: (-) Done in order to reduce swelling and pain     Assessment: Patient tolerated treatment well. Patient needs to continue to work on LE strength and endurance.      Plan: continue per POC     No future appointments.     Unbilled Time:  Units: 3TE        Mickeal Montana, PTA     Visit Approval Visit # Therapist initials Date A NS / Cx < 24 hr >24 hr Cx Comments     1 W 3/22 [x]?   []?  []?  Initial evaluation     2  3/29 [x]?  []?  []?        3  3/31 [x]?  []?  []?        4  4/4 [x]?  []?  []?        5  4/7 [x]?  []?  []?        6 [facility] 4/28 [x]?  []?  []?        7  5/4 [x]?  []?  []?        8  5/5 [x]?  []?  []?        9 [facility] 5/19 [x]?  []?  []?         10    5/23  [x]?  []?  []?        11    6/7  [x]?  []?  []?        12     6/10 [x]?  []?  []?              []?  []?  []?              []?  []?  []?              []?  []?  []?              []?  []?  []?              []?  []?  []?              []?  []?  []?

## 2022-06-13 ENCOUNTER — HOSPITAL ENCOUNTER (OUTPATIENT)
Dept: PHYSICAL THERAPY | Age: 87
Setting detail: RECURRING SERIES
End: 2022-06-13
Payer: MEDICARE

## 2022-06-13 NOTE — THERAPY RECERTIFICATION
Therese Bee  : 1930  Primary: [place] [employer]age [employer]  Secondary:  [address] @ [facility]  [address] [address]  Phone: [phone]  Fax: [phone] No data recorded  Plan of Care/Certification Expiration Date: 22      PT Visit Info:    No data recorded    OUTPATIENT PHYSICAL THERAPY:OP NOTE TYPE: Recertification                Episode  Appt Desk         Treatment Diagnosis:  M62.81, R26.89  * No diagnoses found *  Medical/Referring Diagnosis:  No admission diagnoses are documented for this encounter. Referring Physician:  Aryan Lawrence MD MD Orders:  PT Eval and Treat   Return MD Appt:    Date of Onset:  Onset Date: 22     Allergies:  Patient has no allergy information on record. Restrictions/Precautions:    No data recordedNo data recorded   Medications Last Reviewed:  6/10/2022     SUBJECTIVE   History of Injury/Illness (Reason for Referral):  Pt had a fall in the last week, this is fall 2-3 in the last few months. All the falls have had similar causes, feels weak in a leg and stumbles. Has not sustained any significant injuries with these falls. States he is having difficulty rising from a bent over or squatted position. Feels weaker overall. Pt has a cane, carries it around more than he uses it, Denies numbness/tingling in the legs. Walking > 10-15min fatigues him. Patient Stated Goal(s):  \"Better balance\"  Initial:      /10 Post Session:      /10  Past Medical History/Comorbidities:   Mr. Sandra Motley  has no past medical history on file. Mr. Sandra Motley  has a past surgical history that includes Prostatectomy; Cataract removal; and Colonoscopy.   Social History/Living Environment:   Lives With: Spouse  Type of Home: House  Home Layout: One level  Home Access: Stairs to enter with rails     Prior Level of Function/Work/Activity:   Prior level of function: House work  Prior level of function: Independent  Occupation: Retired  No data [id number] recorded   Learning:   Does the patient/guardian have any barriers to learning?: No barriers  Will there be a co-learner?: No  What is the preferred language of the patient/guardian?: English  Is an  required?: No  How does the patient/guardian prefer to learn new concepts?: Listening; Reading; Demonstration; Pictures/Videos     Fall Risk Scale: Total Score: 54  Jeong Fall Risk: High (45 and higher)     Personal Factors:        Sex:  male        Age:  80 y.o.        OBJECTIVE   *= Painful     WNL= within normal limits     NT= not tested    Observation  Gait: quick, erratic at times, B varus, leans to the R and L causing him to have a non-straight path      Strength (all MMT scores are graded on a scale of 0-5)   Right Left   Hip      Flexion 5 5   Abduction 4 4   Extension 4 4   Glute Max 4 4   Knee     Extension 5 5   Flexion 5 5         Functional Mobility  TUG: 10.5secsec  30sec Sit to Stand: 8x (w/o hands)      Balance  Rhomberg:   Eyes open, stable surface: 120sec   Eyes closed, stable surface: 15sec  Sharpened Rhomberg:   R in front: 30sec   L in front: 30sec  SL balance:   R: 2sec   L: 1sec    Tool Used: TINETTI  Score:  Initial:   Gait: 9/12  Balance: 10/16  TOTAL: 19/28 Most Recent:  Gait: 10/12  Balance: 13/16  TOTAL: 23/28   Interpretation of Score: The maximum score for the gait component is 12 points. The maximum score for the balance component is 16 points. The maximum total score is 28 points. In general, patients who score below 19 are at a high risk for falls. Patients who score in the range of 19-24 indicate that the patient has a risk for falls. Tool Used: Dynamic Gait Index  Score:  Initial: 14/24 Most Recent: 15/24 (Date: 6/10/22 )   Interpretation of Score: Each section is scored on a 0-3 scale, 0 representing the patients inability to perform the task and 3 representing independence.   The scores of each section are added together for a total score of 24. Any score below 19 indicates increased risk for falls. ASSESSMENT   Initial Assessment:  Mr. Nikki Arguello presents to physical therapy with MD diagnosis of a frequent falls. Pt demonstrated signs and symptoms consistent with this diagnosis. On objective examination, the patient demonstrated deficits in ROM, strength, joint mobility, soft tissue mobility, functional ability, functional mobility, ambulatory ability and balance. The patient also had increased fall risk. The patient is limited in the following activities: walking, standing, transfers, ADLs, functional tasks, mobility, stairs. The patient has a good  prognosis for recovery based on the examination findings and may be limited by: N/A. Patient requires skilled physical therapy services in order to return to prior level of function. REASSESSMENT: Mr. Nikki Arguello  presents to physical therapy with MD diagnosis of a frequent falls. Pt demonstrated signs and symptoms consistent with this diagnosis. On objective examination, the patient demonstrated improvements in ROM, strength, functional ability, functional mobility, ambulatory ability and balance. The patient also had increased fall risk. The patient is limited in the following activities: walking, standing, transfers, ADLs, functional tasks, mobility, stairs. The patient has a good  prognosis for recovery based on the examination findings and may be limited by: N/A. The patient is making slower progress due to recent falls. Patient requires skilled physical therapy services in order to return to prior level of function. Proble m List: (Impacting functional limitations): Body Structures, Functions, Activity Limitations Requiring Skilled Therapeutic Intervention: Decreased functional mobility ; Decreased ADL status; Decreased ROM; Decreased body mechanics; Decreased tolerance to work activity;  Decreased strength; Decreased safe awareness; Decreased endurance; Decreased balance; Decreased high-level IADLs; Increased pain; Decreased posture     Therapy Prognosis:   Therapy Prognosis: Good     Assessment Complexity:   Low Complexity  PLAN   Effective Dates: 6/10/2022 TO Plan of Care/Certification Expiration Date: 09/11/22     Frequency/Duration: No data recorded   Interventions Planned (Treatment may consist of any combination of the following):    No data recorded   Goals: (Goals have been discussed and agreed upon with patient.)  Short-Term Functional Goals: Time Frame: 4 weeks  1. Pt will be compliant with progressive HEP-- goal met  2. Pt will be able to perform 10min on aerobic exercise device without significant fatigue (> 5/10)-- goal met  Discharge Goals: Time Frame: 10 weeks  1. Pt will be able to walk > 20min without significant fatigue (> 5/10)  2. Pt will improve LONG score by 6pts  3. Pt will improve DGI score by 4pts         Outcome Measure: Tool Used: Long Balance Scale  Score:  Initial: 36/56 Most Recent: 40/56 (Date: 6/10/22 )   Interpretation of Score: Each section is scored on a 0-4 scale, 0 representing the patients inability to perform the task and 4 representing independence. The scores of each section are added together for a total score of 56. The higher the patients score, the more independent the patient is. Any score below 45 indicates increased risk for falls. Medical Necessity:    Skilled intervention continues to be required due to return to prior level of function and decrease fall risk. Reason For Services/Other Comments:   Patient continues to require skilled intervention due to return to prior level of function and decrease fall risk. Total Duration:       Regarding Johan Padilla's therapy, I certify that the treatment plan above will be carried out by a therapist or under their direction.   Thank you for this referral,  Laurence An PT     Referring Physician Signature: Sandhya Holt MD _______________________________ Date _____________        Post Session Pain  Charge Capture  PT Visit Info  POC Link  Treatment Note Link  MD Guidelines  MyChart

## 2022-06-14 ENCOUNTER — HOSPITAL ENCOUNTER (OUTPATIENT)
Dept: PHYSICAL THERAPY | Age: 87
Setting detail: RECURRING SERIES
End: 2022-06-14
Payer: MEDICARE

## 2022-06-16 ENCOUNTER — HOSPITAL ENCOUNTER (OUTPATIENT)
Dept: PHYSICAL THERAPY | Age: 87
Setting detail: RECURRING SERIES
Discharge: HOME | End: 2022-06-19
Payer: MEDICARE

## 2022-06-16 PROCEDURE — 97110 THERAPEUTIC EXERCISES: CPT

## 2022-06-16 NOTE — PROGRESS NOTES
Nicole Kessler  : 1930  Primary: Sony Koehler [place] Medicare Advantage  Secondary:  [address]  at [facility]  [address] [facility], [facility], [address]  [phone]   [phone]           OUTPATIENT PHYSICAL THERAPY:Daily Note 2022           TREATMENT:    PT Patient Time In/Time Out  Time In: 1100  Time Out: 1145      Total Time: 45min  Visit Count:  11     ICD-10: Treatment Diagnosis: M62.81, R26.89  Medication Last Reviewed: 22        TREATMENT PLAN  Effective Dates: 3/22/2022 TO 2022 (90 days). Frequency/Duration: 2 times a week for 90 Day(s)            · Subjective: Patient reports feeling better today.   · Pain: 2/10     Objective:         Therapeutic Exercise: (45min) Done in order to improve strength, ROM and understanding of current condition.     Date   Date   Date  22 Date  6/10/22 Date  22   Activity/Exercise         Education         NuStep x10min lvl 4 x10min lvl 4 X 10 min   X 10 min   Standing Balance ·   ·  ·  ·  ·    Hip ABD 2x10 B 17.5lbs 2x10 B 17.5lbs 2x10 B 17.5lbs 3 x 10 B  17.5lbs 3 x 10 B  17.5lbs   Hip Flexion 2x10 B 17.5lbs 2x10 B 17.5lbs 2x10 B 17.5lbs 3 x 10 B   17.5 lbs 3 x 10 B  17.5lbs   Sit to Stand 3x10 (airex in chair) 2 x 10 airex in chair 2 x 10 airex in chair 3 x 10 airex in chair 3 x 10 airex   Walking         Sidestepping    X 5 along plinth     Step Taps x25 X 25 X 25     Hamstring curl machine #25 3 x 10 #25 3 x 10 #25 3 x 10 #25 3 x 10 #25 3 x 10   Marching  X 25   3 x 20 with 1 min rest   3 x 20 with 1 min rest    shuttle   #75 3 x 10   # 50 3 x 10   Standing trunk ext  2 x 10 2 x 10           Manual Therapy: (0min) Done in order to improve joint and soft tissue mobility,reduce muscle guarding, and decrease muscle tone    Date:    Date:    Date:    Date:      Type Parameters         Joint Mobilization           Soft Tissue Mobilization                 Modalities: (-) Done in order to reduce swelling and pain     Assessment: Patient tolerated treatment well. Patient needs to continue to work on LE strength and endurance. Less fatigue during ambulation     Plan: continue per POC     No future appointments.     Unbilled Time:  Units: 3TE        Patti Bae PTA     Visit Approval Visit # Therapist initials Date A NS / Cx < 24 hr >24 hr Cx Comments     1 W 3/22 [x]?   []?  []?  Initial evaluation     2  3/29 [x]?  []?  []?        3  3/31 [x]?  []?  []?        4  4/4 [x]?  []?  []?        5  4/7 [x]?  []?  []?        6 [facility] 4/28 [x]?  []?  []?        7  5/4 [x]?  []?  []?        8  5/5 [x]?  []?  []?        9 [facility] 5/19 [x]?  []?  []?         10    5/23  [x]?  []?  []?        11    6/7  [x]?  []?  []?        12     6/10 [x]?  []?  []?        13   6/16  [x]?  []?  []?              []?  []?  []?              []?  []?  []?              []?  []?  []?              []?  []?  []?              []?  []?  []?

## 2022-06-16 NOTE — PROGRESS NOTES
Nery Barber  : 1930  Primary: [address]  Medicare Advantage Hmo  Secondary:  [address] @ [facility]  [address] [place] [address] [facility] [address]  Phone: [phone]  Fax: [phone] Plan Frequency: 2x/week for 90 days    Plan of Care/Certification Expiration Date: 22      PT Visit Info:  No data recorded       OUTPATIENT PHYSICAL THERAPY 2022     Appt Desk   Episode   MyChart      Mr. Jin Perez cancelled appointment due to illness.     Williammartínez Sheffield, RICHARD    Future Appointments   Date Time Provider Tasneem Beltrán   2022 10:15 AM Aston Dyson [facility]   2022 11:00 AM Ap [facility]   2022 10:15 AM William Sheffield PTA [facility]   2022 11:00 AM Williammartínez Sheffield PTA [facility]

## 2022-06-21 ENCOUNTER — HOSPITAL ENCOUNTER (OUTPATIENT)
Dept: PHYSICAL THERAPY | Age: 87
Setting detail: RECURRING SERIES
Discharge: HOME | End: 2022-06-24
Payer: MEDICARE

## 2022-06-21 PROCEDURE — 97110 THERAPEUTIC EXERCISES: CPT

## 2022-06-21 ASSESSMENT — PAIN SCALES - GENERAL: PAINLEVEL_OUTOF10: 0

## 2022-06-21 NOTE — PROGRESS NOTES
Carlyn Koenig  : 1930  Primary: [address]  Medicare Advantage Hmo  Secondary:  [address] @ [facility]  [address] [place] [address] [facility] [place] [address]  Phone: [phone]  Fax: [phone] Plan Frequency: 2x/week for 90 days    Plan of Care/Certification Expiration Date: 22      PT Visit Info:   No data recorded    OUTPATIENT PHYSICAL THERAPY:OP NOTE TYPE: Treatment Note 2022       Episode  }Appt Desk              Treatment Diagnosis:  M62.81, R26.89  Medical/Referring Diagnosis:  No admission diagnoses are documented for this encounter. Referring Physician:  Hosea Mcclellan MD MD Orders:  PT Eval and Treat   Date of Onset:  Onset Date: 22     Allergies:   Patient has no allergy information on record. Restrictions/Precautions:  No data recordedNo data recorded   Interventions Planned (Treatment may consist of any combination of the following):    Current Treatment Recommendations: Strengthening; ROM; Balance training; Functional mobility training; Transfer training; ADL/Self-care training; Endurance training; Gait training; Stair training; Neuromuscular re-education; Manual Therapy - Soft Tissue Mobilization; Manual Therapy - Joint Manipulation; Pain management; Return to work related activity; Home exercise program; Safety education & training; Patient/Caregiver education & training; Equipment evaluation, education, & procurement; Therapeutic activities     Subjective Comments:  Pt states he is doing well. No recent falls  Initial:}    0/10Post Session:       0/10  Medications Last Reviewed:  2022  Updated Objective Findings:  None Today  Treatment   THERAPEUTIC EXERCISE: (45 minutes):    Exercises per grid below to improve mobility, strength, balance and coordination. Required minimal visual, verbal, manual and tactile cues to promote proper body posture and promote proper body mechanics.   Progressed resistance, range, repetitions and complexity of movement as indicated. Date:  6/21 Date:   Date:     Activity/Exercise Parameters Parameters Parameters   Education      NuStep x10min lvl 5     Standing Balance x10min (DL feet together, head movement, ball passes)     Step Overs 2x10 B (over cones)     Stairs 2x5     Sit to Stand 3x10 (airex)                                               Treatment/Session Summary:    · Treatment Assessment:  Pt tolerated session well, difficulty with step overs  · Communication/Consultation:  None today  · Equipment provided today:  None  · Recommendations/Intent for next treatment session: Next visit will focus on progression as tolerated.     Total Treatment Billable Duration:  45 minutes   Time In: 2782  Time Out: [address], PT       Charge Capture  }Post Session Pain  PT Visit Info  [employer] Portal  MD Guidelines  Scanned Media  Benefits  MyChart    Future Appointments   Date Time Provider Tasneem Beltrán   6/23/2022 11:00 AM [address], [facility]   6/28/2022 10:15 AM [address], [facility]   6/30/2022 11:00 AM [address], [facility]

## 2022-06-23 ENCOUNTER — HOSPITAL ENCOUNTER (OUTPATIENT)
Dept: PHYSICAL THERAPY | Age: 87
Setting detail: RECURRING SERIES
Discharge: HOME | End: 2022-06-26
Payer: MEDICARE

## 2022-06-23 PROCEDURE — 97110 THERAPEUTIC EXERCISES: CPT

## 2022-06-23 ASSESSMENT — PAIN SCALES - GENERAL: PAINLEVEL_OUTOF10: 0

## 2022-06-23 NOTE — PROGRESS NOTES
Margaret Owensjerrell  : 1930  Primary: Bogdan [place] Medicare Advantage Hmo  Secondary:  [address] @ [facility]  [address] [facility] [address]  Phone: [phone]  Fax: [phone] Plan Frequency: 2x/week for 90 days    Plan of Care/Certification Expiration Date: 22      PT Visit Info:   No data recorded    OUTPATIENT PHYSICAL THERAPY:OP NOTE TYPE: Treatment Note 2022       Episode  }Appt Desk              Treatment Diagnosis:  M62.81, R26.89  Medical/Referring Diagnosis:  No admission diagnoses are documented for this encounter. Referring Physician:  Verna Berger MD MD Orders:  PT Eval and Treat   Date of Onset:  Onset Date: 22     Allergies:   Patient has no allergy information on record. Restrictions/Precautions:  No data recordedNo data recorded   Interventions Planned (Treatment may consist of any combination of the following):    Current Treatment Recommendations: Strengthening; ROM; Balance training; Functional mobility training; Transfer training; ADL/Self-care training; Endurance training; Gait training; Stair training; Neuromuscular re-education; Manual Therapy - Soft Tissue Mobilization; Manual Therapy - Joint Manipulation; Pain management; Return to work related activity; Home exercise program; Safety education & training; Patient/Caregiver education & training; Equipment evaluation, education, & procurement; Therapeutic activities     Subjective Comments:  Patient reports feeling pretty good today. Initial:}    0/10Post Session:       0/10  Medications Last Reviewed:  2022  Updated Objective Findings:  None Today  Treatment   THERAPEUTIC EXERCISE: (45 minutes):    Exercises per grid below to improve mobility, strength, balance and coordination. Required minimal visual, verbal, manual and tactile cues to promote proper body posture and promote proper body mechanics.   Progressed resistance, range, repetitions and complexity of movement as indicated. Date:  6/21 Date:  6/23 Date:     Activity/Exercise Parameters Parameters Parameters   Education      NuStep x10min lvl 5 x10min lvl 5    Standing Balance x10min (DL feet together, head movement, ball passes) x10min (DL feet together, head movement, ball passes)    Step Overs 2x10 B (over cones) 2x10 B (over cones)    Stairs 2x5 2 x5    Sit to Stand 3x10 (airex) 3x10 (airex)    stuffle  # 75 3 x 10                                        Treatment/Session Summary:    · Treatment Assessment:  Patient tolerated treatment well. Patient indicates legs are feeling stronger. · Communication/Consultation:  None today  · Equipment provided today:  None  · Recommendations/Intent for next treatment session: Next visit will focus on progression as tolerated.     Total Treatment Billable Duration:  45 minutes   Time In: 1100  Time Out: [address] [place], [place]       Charge Capture  }Post Session Pain  PT Visit Info  [employer] Portal  MD Guidelines  Scanned Media  Benefits  MyChart    Future Appointments   Date Time Provider Tasneem Beltrán   6/28/2022 10:15 AM [facility]   [facility]

## 2022-06-28 ENCOUNTER — HOSPITAL ENCOUNTER (OUTPATIENT)
Dept: PHYSICAL THERAPY | Age: 87
Setting detail: RECURRING SERIES
Discharge: HOME | End: 2022-07-01
Payer: MEDICARE

## 2022-06-28 PROCEDURE — 97110 THERAPEUTIC EXERCISES: CPT

## 2022-06-28 ASSESSMENT — PAIN SCALES - GENERAL: PAINLEVEL_OUTOF10: 3

## 2022-06-28 NOTE — PROGRESS NOTES
Nick Richards  : 1930  Primary: Espinal Owusu Joe Medicare Advantage Hmo  Secondary:  [address] @ [facility]  [address] [facility] Azeem Ritter [place] [address]  Phone: [phone]  Fax: [phone] Plan Frequency: 2x/week for 90 days    Plan of Care/Certification Expiration Date: 22      PT Visit Info:   No data recorded    OUTPATIENT PHYSICAL THERAPY:OP NOTE TYPE: Treatment Note 2022       Episode  }Appt Desk              Treatment Diagnosis:  M62.81, R26.89  Medical/Referring Diagnosis:  No admission diagnoses are documented for this encounter. Referring Physician:  Nicole Rosa MD MD Orders:  PT Eval and Treat   Date of Onset:  Onset Date: 22     Allergies:   Patient has no allergy information on record. Restrictions/Precautions:  No data recordedNo data recorded   Interventions Planned (Treatment may consist of any combination of the following):    Current Treatment Recommendations: Strengthening; ROM; Balance training; Functional mobility training; Transfer training; ADL/Self-care training; Endurance training; Gait training; Stair training; Neuromuscular re-education; Manual Therapy - Soft Tissue Mobilization; Manual Therapy - Joint Manipulation; Pain management; Return to work related activity; Home exercise program; Safety education & training; Patient/Caregiver education & training; Equipment evaluation, education, & procurement; Therapeutic activities     Subjective Comments:  Patient reports he fell on friday going into the docto's offfice. He states he miss stepped and landed on his knee and arm., but was able to get up on his on. Initial:}    3/10Post Session:       3/10  Medications Last Reviewed:  2022  Updated Objective Findings:  None Today  Treatment   THERAPEUTIC EXERCISE: (45 minutes):    Exercises per grid below to improve mobility, strength, balance and coordination.   Required minimal visual, verbal, manual and tactile cues to promote proper body posture and promote proper body mechanics. Progressed resistance, range, repetitions and complexity of movement as indicated. Date:  6/21 Date:  6/23 Date:     Activity/Exercise Parameters Parameters Parameters   Education      NuStep x10min lvl 5 x10min lvl 5    Standing Balance x10min (DL feet together, head movement, ball passes) x10min (DL feet together, head movement, ball passes)    Step Overs 2x10 B (over cones) 2x10 B (over cones)    Stairs 2x5 2 x5    Sit to Stand 3x10 (airex) 3x10 (airex)    stuffle  # 75 3 x 10                                        Treatment/Session Summary:    · Treatment Assessment:  Patient tolerated all exercises well. Patient indicates he feels stronger, but still unsteady. · Communication/Consultation:  None today  · Equipment provided today:  None  · Recommendations/Intent for next treatment session: Next visit will focus on progression as tolerated.     Total Treatment Billable Duration:  45 minutes   Time In: 2771  Time Out: [address] [place], [place]       Charge Capture  }Post Session Pain  PT Visit Info  [employer] Portal  MD Guidelines  Scanned Media  Benefits  MyChart    Future Appointments   Date Time Provider Tasneem Beltrán   6/30/2022 11:00 AM [address] [facility], [facility]

## 2022-06-30 ENCOUNTER — HOSPITAL ENCOUNTER (OUTPATIENT)
Dept: PHYSICAL THERAPY | Age: 87
Setting detail: RECURRING SERIES
Discharge: HOME | End: 2022-07-03
Payer: MEDICARE

## 2022-06-30 PROCEDURE — 97110 THERAPEUTIC EXERCISES: CPT

## 2022-06-30 ASSESSMENT — PAIN SCALES - GENERAL: PAINLEVEL_OUTOF10: 2

## 2022-06-30 NOTE — PROGRESS NOTES
Nick Richards  : 1930  Primary: [employer]  Secondary:  [address] @ [facility]  [address] [facility] Azeem Ritter [place] [address]  Phone: [phone]  Fax: [phone] Plan Frequency: 2x/week for 90 days    Plan of Care/Certification Expiration Date: 22      PT Visit Info:   No data recorded    OUTPATIENT PHYSICAL THERAPY:OP NOTE TYPE: Treatment Note 2022       Episode  }Appt Desk              Treatment Diagnosis:  M62.81, R26.89  Medical/Referring Diagnosis:  No admission diagnoses are documented for this encounter. Referring Physician:  Nicole Rosa MD MD Orders:  PT Eval and Treat   Date of Onset:  Onset Date: 22     Allergies:   Patient has no allergy information on record. Restrictions/Precautions:  No data recordedNo data recorded   Interventions Planned (Treatment may consist of any combination of the following):    Current Treatment Recommendations: Strengthening; ROM; Balance training; Functional mobility training; Transfer training; ADL/Self-care training; Endurance training; Gait training; Stair training; Neuromuscular re-education; Manual Therapy - Soft Tissue Mobilization; Manual Therapy - Joint Manipulation; Pain management; Return to work related activity; Home exercise program; Safety education & training; Patient/Caregiver education & training; Equipment evaluation, education, & procurement; Therapeutic activities     Subjective Comments:  Patient reports feeling better still some tenderness in his right knee. Initial:}    2/10Post Session:       2/10  Medications Last Reviewed:  2022  Updated Objective Findings:  None Today  Treatment   THERAPEUTIC EXERCISE: (45 minutes):    Exercises per grid below to improve mobility, strength, balance and coordination. Required minimal visual, verbal, manual and tactile cues to promote proper body posture and promote proper body mechanics.   Progressed resistance, range, repetitions and complexity of movement as indicated. Date:  6/21 Date:  6/23 Date:  6/30   Activity/Exercise Parameters Parameters Parameters   Education      NuStep x10min lvl 5 x10min lvl 5 x10min lvl 5   Standing Balance x10min (DL feet together, head movement, ball passes) x10min (DL feet together, head movement, ball passes)    Step Overs 2x10 B (over cones) 2x10 B (over cones)    Stairs 2x5 2 x5 X 10   Sit to Stand 3x10 (airex) 3x10 (airex) 3 x 10   stuffle  # 75 3 x 10    3 way hip machine   Flex/abd #17.5 4 x 10 ext # 25 4 x 10   Hamstring curl machine   # 25 4 x 10   Single leg stands   3 x 1 min B                     Treatment/Session Summary:    · Treatment Assessment:     · Communication/Consultation:  None today  · Equipment provided today:  None  · Recommendations/Intent for next treatment session: Next visit will focus on progression as tolerated.     Total Treatment Billable Duration:  45 minutes   Time In: 1100  Time Out: 1550 [place], [place]       Charge Capture  }Post Session Pain  PT Visit Info  [employer] Portal  MD Guidelines  Scanned Media  Benefits  MyChart    Future Appointments   Date Time Provider Tasneem Beltrán   7/6/2022  3:15 PM [address] [place] [address]   7/11/2022  2:30 PM Sharona Flores PT STEFF CHARLES   7/13/2022  3:15 PM Sharona Flores PT STEFF CALOS

## 2022-07-06 ENCOUNTER — HOSPITAL ENCOUNTER (OUTPATIENT)
Dept: PHYSICAL THERAPY | Age: 87
Setting detail: RECURRING SERIES
Discharge: HOME | End: 2022-07-09
Payer: MEDICARE

## 2022-07-06 PROCEDURE — 97110 THERAPEUTIC EXERCISES: CPT

## 2022-07-06 ASSESSMENT — PAIN SCALES - GENERAL: PAINLEVEL_OUTOF10: 2

## 2022-07-06 NOTE — PROGRESS NOTES
Nonda Fragmin  : 1930  Primary: [employer]  Secondary:  [address] @ [facility]  [address] [place], [place] [address] [place] [address]  Phone: [phone]  Fax: [phone] Plan Frequency: 2x/week for 90 days    Plan of Care/Certification Expiration Date: 22      PT Visit Info:   No data recorded    OUTPATIENT PHYSICAL THERAPY:OP NOTE TYPE: Treatment Note 2022       Episode  }Appt Desk              Treatment Diagnosis:  M62.81, R26.89  Medical/Referring Diagnosis:  No admission diagnoses are documented for this encounter. Referring Physician:  Susan Lacy MD MD Orders:  PT Eval and Treat   Date of Onset:  Onset Date: 22     Allergies:   Patient has no allergy information on record. Restrictions/Precautions:  No data recordedNo data recorded   Interventions Planned (Treatment may consist of any combination of the following):    Current Treatment Recommendations: Strengthening; ROM; Balance training; Functional mobility training; Transfer training; ADL/Self-care training; Endurance training; Gait training; Stair training; Neuromuscular re-education; Manual Therapy - Soft Tissue Mobilization; Manual Therapy - Joint Manipulation; Pain management; Return to work related activity; Home exercise program; Safety education & training; Patient/Caregiver education & training; Equipment evaluation, education, & procurement; Therapeutic activities     Subjective Comments:  Patient reports having some left knee pain today. Initial:}    2/10Post Session:       2/10  Medications Last Reviewed:  2022  Updated Objective Findings:  None Today  Treatment   THERAPEUTIC EXERCISE: (45 minutes):    Exercises per grid below to improve mobility, strength, balance and coordination. Required minimal visual, verbal, manual and tactile cues to promote proper body posture and promote proper body mechanics.   Progressed resistance, range, repetitions and complexity of

## 2022-07-11 ENCOUNTER — HOSPITAL ENCOUNTER (OUTPATIENT)
Dept: PHYSICAL THERAPY | Age: 87
Setting detail: RECURRING SERIES
End: 2022-07-11
Payer: MEDICARE

## 2022-07-11 NOTE — PROGRESS NOTES
Henry Simon  : 1930  Primary: [employer]  Secondary:  [address] @ [facility]  [address] [place] [address]  Phone: [phone]  Fax: [phone] Plan Frequency: 2x/week for 90 days    Plan of Care/Certification Expiration Date: 22      PT Visit Info:  No data recorded       OUTPATIENT PHYSICAL THERAPY 2022     Appt Desk   Episode   MyChart      Mr. Deena Clark cancelled appointment today due to schedule conflict.     Hung Hemphill, PTA    Future Appointments   Date Time Provider Tasneem Beltrán   2022  2:30 PM Hung Hemphill, [facility]   2022  3:15 PM Leonid Harrell, PT [facility]

## 2022-07-13 ENCOUNTER — HOSPITAL ENCOUNTER (OUTPATIENT)
Dept: PHYSICAL THERAPY | Age: 87
Setting detail: RECURRING SERIES
Discharge: HOME | End: 2022-07-16
Payer: MEDICARE

## 2022-07-13 PROCEDURE — 97110 THERAPEUTIC EXERCISES: CPT

## 2022-07-13 ASSESSMENT — PAIN SCALES - GENERAL: PAINLEVEL_OUTOF10: 0

## 2022-07-13 NOTE — THERAPY DISCHARGE
Nonda Fragmin  : 1930  Primary: [employer]  Secondary:  [address] @ [facility]  [address] [address]  Phone: [phone]  Fax: [phone]     PT Visit Info:    No data recorded    OUTPATIENT PHYSICAL THERAPY:OP NOTE TYPE: Discharge Summary 2022               Episode  Appt Desk         Treatment Diagnosis:  M62.81, R26.89  * No diagnoses found *  Medical/Referring Diagnosis:  No admission diagnoses are documented for this encounter. Referring Physician:  Susan Lacy MD MD Orders:  PT Eval and Treat   Return MD Appt:    Date of Onset:  Onset Date: 22     Allergies:  Patient has no allergy information on record. Restrictions/Precautions:    No data recordedNo data recorded   Medications Last Reviewed:  2022     SUBJECTIVE   History of Injury/Illness (Reason for Referral):  Pt had a fall in the last week, this is fall 2-3 in the last few months. All the falls have had similar causes, feels weak in a leg and stumbles. Has not sustained any significant injuries with these falls. States he is having difficulty rising from a bent over or squatted position. Feels weaker overall. Pt has a cane, carries it around more than he uses it, Denies numbness/tingling in the legs. Walking > 10-15min fatigues him. Patient Stated Goal(s):  \"Better balance\"  Initial:     0/10 Post Session:     0/10  Past Medical History/Comorbidities:   Mr. Lon Greenfield  has no past medical history on file. Mr. Lon Greenfield  has a past surgical history that includes Prostatectomy; Cataract removal; and Colonoscopy.   Social History/Living Environment:   Lives With: Spouse  Type of Home: House  Home Layout: One level  Home Access: Stairs to enter with rails     Prior Level of Function/Work/Activity:   Prior level of function: House work  Prior level of function: Independent  Occupation: Retired  No data [id number] recorded   Learning:   Does the patient/guardian have any barriers to learning?: No barriers  Will there be a co-learner?: No  What is the preferred language of the patient/guardian?: English  Is an  required?: No  How does the patient/guardian prefer to learn new concepts?: Listening; Reading; Demonstration; Pictures/Videos     Fall Risk Scale: Total Score: 54  Jeong Fall Risk: High (45 and higher)     Personal Factors:        Sex:  male        Age:  80 y.o.        OBJECTIVE   *= Painful     WNL= within normal limits     NT= not tested    Observation  Gait: quick, erratic at times, B varus, leans to the R and L causing him to have a non-straight path      Strength (all MMT scores are graded on a scale of 0-5)   Right Left   Hip      Flexion 5 5   Abduction 4 4   Extension 4 4   Glute Max 4 4   Knee     Extension 5 5   Flexion 5 5         Functional Mobility  TUG: 10.5secsec  30sec Sit to Stand: 14x (w/o hands)      Balance  Rhomberg:   Eyes open, stable surface: 120sec   Eyes closed, stable surface: 15sec  Sharpened Rhomberg:   R in front: 30sec   L in front: 30sec  SL balance:   R: 2sec   L: 1sec    Tool Used: TINETTI  Score:  Initial:   Gait: 9/12  Balance: 10/16  TOTAL: 19/28 Most Recent:  Gait: 10/12  Balance: 13/16  TOTAL: 23/28   Interpretation of Score: The maximum score for the gait component is 12 points. The maximum score for the balance component is 16 points. The maximum total score is 28 points. In general, patients who score below 19 are at a high risk for falls. Patients who score in the range of 19-24 indicate that the patient has a risk for falls. Tool Used: Dynamic Gait Index  Score:  Initial: 14/24 Most Recent: 19/24 (Date: 7/13/22 )   Interpretation of Score: Each section is scored on a 0-3 scale, 0 representing the patients inability to perform the task and 3 representing independence. The scores of each section are added together for a total score of 24.   Any score below 19 indicates increased risk for falls.      ASSESSMENT   Initial Assessment:  Mr. Colleen Baker presents to physical therapy with MD diagnosis of a frequent falls. Pt demonstrated signs and symptoms consistent with this diagnosis. On objective examination, the patient demonstrated deficits in ROM, strength, joint mobility, soft tissue mobility, functional ability, functional mobility, ambulatory ability and balance. The patient also had increased fall risk. The patient is limited in the following activities: walking, standing, transfers, ADLs, functional tasks, mobility, stairs. The patient has a good  prognosis for recovery based on the examination findings and may be limited by: N/A. Patient requires skilled physical therapy services in order to return to prior level of function. Discharge Assessment: Mr. Colleen Baker  presents to physical therapy with MD diagnosis of a frequent falls. Pt demonstrated signs and symptoms consistent with this diagnosis. On objective examination, the patient demonstrated improvements in ROM, strength, functional ability, functional mobility, ambulatory ability and balance. The patient also had decreased fall risk. The patient is limited in the following activities: balance, functional tasks. The patient has achieved all goals       PLAN   Effective Dates: Discharge     Goals: (Goals have been discussed and agreed upon with patient.)  Short-Term Functional Goals: Time Frame: 4 weeks  1. Pt will be compliant with progressive HEP-- goal met  2. Pt will be able to perform 10min on aerobic exercise device without significant fatigue (> 5/10)-- goal met  Discharge Goals: Time Frame: 10 weeks  1. Pt will be able to walk > 20min without significant fatigue (> 5/10)-- goal met  2. Pt will improve LONG score by 6pts-- goal met  3. Pt will improve DGI score by 4pts-- goal met         Outcome Measure:    Tool Used: Long Balance Scale  Score:  Initial: 36/56 Most Recent: 44/56 (Date: 6/10/22 )   Interpretation of Score: Each section is scored on a 0-4 scale, 0 representing the patients inability to perform the task and 4 representing independence. The scores of each section are added together for a total score of 56. The higher the patients score, the more independent the patient is. Any score below 45 indicates increased risk for falls. Medical Necessity:   Discharge  Total Duration:       Regarding Selena Padilla's therapy, I certify that the treatment plan above will be carried out by a therapist or under their direction.   Thank you for this referral,  Sridhar Vargas, PT     Referring Physician Signature: Magdiel Coffman MD _______________________________ Date _____________        Post Session Pain  Charge Capture  PT Visit Info  POC Link  Treatment Note Link  MD Guidelines  MyChart

## 2022-07-13 NOTE — PROGRESS NOTES
Brody Perez  : 1930  Primary: [employer]  Secondary:  [address] @ [facility]  [address] [facility] Isabel Thao [place] [address]  Phone: [phone]  Fax: [phone] Plan Frequency: 2x/week for 90 days    Plan of Care/Certification Expiration Date: 22      PT Visit Info:   No data recorded    OUTPATIENT PHYSICAL THERAPY:OP NOTE TYPE: Treatment Note 2022       Episode  }Appt Desk              Treatment Diagnosis:  M62.81, R26.89  Medical/Referring Diagnosis:  No admission diagnoses are documented for this encounter. Referring Physician:  Marcela Cisneros MD MD Orders:  PT Eval and Treat   Date of Onset:  Onset Date: 22     Allergies:   Patient has no allergy information on record. Restrictions/Precautions:  No data recordedNo data recorded   Interventions Planned (Treatment may consist of any combination of the following):    Current Treatment Recommendations: Strengthening; ROM; Balance training; Functional mobility training; Transfer training; ADL/Self-care training; Endurance training; Gait training; Stair training; Neuromuscular re-education; Manual Therapy - Soft Tissue Mobilization; Manual Therapy - Joint Manipulation; Pain management; Return to work related activity; Home exercise program; Safety education & training; Patient/Caregiver education & training; Equipment evaluation, education, & procurement; Therapeutic activities     Subjective Comments:  Pt reported he was very sore after last treatment. Initial:}    0/10Post Session:       0/10  Medications Last Reviewed:  2022  Updated Objective Findings:  See evaluation note from today  Treatment   THERAPEUTIC EXERCISE: (45 minutes):    Exercises per grid below to improve mobility, strength, balance and coordination. Required minimal visual, verbal, manual and tactile cues to promote proper body posture and promote proper body mechanics.   Progressed resistance, range, repetitions and complexity of movement as indicated. Date:  6/23 Date:  6/30 Date  7/6/22 Date:  7/13   Activity/Exercise Parameters Parameters Parameters    Education    Discharge, HEP   NuStep x10min lvl 5 x10min lvl 5 X 10 min level 5 x10min lvl 5   Standing Balance x10min (DL feet together, head movement, ball passes)      Step Overs 2x10 B (over cones)      Stairs 2 x5 X 10     Sit to Stand 3x10 (airex) 3 x 10 2 x 10 without assistance    stuffle # 75 3 x 10   3x15 100lbs   3 way hip machine  Flex/abd #17.5 4 x 10 ext # 25 4 x 10 Flex/abd #17.5 4 x 10 ext # 25 4 x 10 Flex/abd #17.5 4 x 10 ext # 25 4 x 10   Hamstring curl machine  # 25 4 x 10 # 25 3 x 15    Single leg stands  3 x 1 min B     Step ups    8 inch step x 30                 Treatment/Session Summary:    · Treatment Assessment:  See Discharge Note from today  · Communication/Consultation:  HEP   · Equipment provided today:  None  · Recommendations/Intent for next treatment session: [place]    Total Treatment Billable Duration:  30 minutes   Time In: 2706  Time Out: Jesus PT       Charge Capture  }Post Session Pain  PT Visit Info  [employer] Portal  MD Guidelines  Scanned Media  Benefits  MyChart    No future appointments.